# Patient Record
Sex: FEMALE | URBAN - METROPOLITAN AREA
[De-identification: names, ages, dates, MRNs, and addresses within clinical notes are randomized per-mention and may not be internally consistent; named-entity substitution may affect disease eponyms.]

---

## 2018-12-10 ENCOUNTER — APPOINTMENT (OUTPATIENT)
Age: 58
Setting detail: DERMATOLOGY
End: 2018-12-10

## 2018-12-10 DIAGNOSIS — L82.0 INFLAMED SEBORRHEIC KERATOSIS: ICD-10-CM

## 2018-12-10 DIAGNOSIS — Z85.820 PERSONAL HISTORY OF MALIGNANT MELANOMA OF SKIN: ICD-10-CM

## 2018-12-10 DIAGNOSIS — L57.0 ACTINIC KERATOSIS: ICD-10-CM

## 2018-12-10 DIAGNOSIS — L738 OTHER SPECIFIED DISEASES OF HAIR AND HAIR FOLLICLES: ICD-10-CM

## 2018-12-10 DIAGNOSIS — L663 OTHER SPECIFIED DISEASES OF HAIR AND HAIR FOLLICLES: ICD-10-CM

## 2018-12-10 PROBLEM — D48.5 NEOPLASM OF UNCERTAIN BEHAVIOR OF SKIN: Status: ACTIVE | Noted: 2018-12-10

## 2018-12-10 PROBLEM — L02.223 FURUNCLE OF CHEST WALL: Status: ACTIVE | Noted: 2018-12-10

## 2018-12-10 PROCEDURE — 11101: CPT

## 2018-12-10 PROCEDURE — OTHER COUNSELING: OTHER

## 2018-12-10 PROCEDURE — OTHER MIPS QUALITY: OTHER

## 2018-12-10 PROCEDURE — 99202 OFFICE O/P NEW SF 15 MIN: CPT | Mod: 25

## 2018-12-10 PROCEDURE — 11100: CPT

## 2018-12-10 PROCEDURE — OTHER PRESCRIPTION: OTHER

## 2018-12-10 PROCEDURE — OTHER BIOPSY BY SHAVE METHOD: OTHER

## 2018-12-10 RX ORDER — MUPIROCIN 20 MG/G
1 OINTMENT TOPICAL BID
Qty: 1 | Refills: 1 | Status: ERX | COMMUNITY
Start: 2018-12-10

## 2018-12-10 ASSESSMENT — LOCATION SIMPLE DESCRIPTION DERM
LOCATION SIMPLE: ABDOMEN
LOCATION SIMPLE: LEFT CALF
LOCATION SIMPLE: RIGHT THIGH
LOCATION SIMPLE: LEFT SHOULDER

## 2018-12-10 ASSESSMENT — LOCATION DETAILED DESCRIPTION DERM
LOCATION DETAILED: LEFT ANTERIOR SHOULDER
LOCATION DETAILED: RIGHT ANTERIOR DISTAL THIGH
LOCATION DETAILED: SUBXIPHOID
LOCATION DETAILED: LEFT PROXIMAL CALF

## 2018-12-10 ASSESSMENT — LOCATION ZONE DERM
LOCATION ZONE: LEG
LOCATION ZONE: TRUNK
LOCATION ZONE: ARM

## 2018-12-10 NOTE — PROCEDURE: MIPS QUALITY
Quality 111:Pneumonia Vaccination Status For Older Adults: Pneumococcal Vaccination not Administered or Previously Received, Reason not Otherwise Specified
Quality 131: Pain Assessment And Follow-Up: Pain assessment using a standardized tool is documented as negative, no follow-up plan required
Detail Level: Detailed
Quality 110: Preventive Care And Screening: Influenza Immunization: Influenza Immunization not Administered for Documented Reasons.
Quality 226: Preventive Care And Screening: Tobacco Use: Screening And Cessation Intervention: Patient screened for tobacco use and is an ex/non-smoker

## 2018-12-10 NOTE — PROCEDURE: BIOPSY BY SHAVE METHOD
Billing Type: Third-Party Bill
Dressing: bandage
Depth Of Biopsy: dermis
Render Post-Care Instructions In Note?: no
Anesthesia Type: 1% lidocaine with epinephrine and a 1:10 solution of 8.4% sodium bicarbonate
Biopsy Method: 15 blade
Hemostasis: Aluminum Chloride
Notification Instructions: Patient will be notified of biopsy results. However, patient instructed to call the office if not contacted within 2 weeks.
Curettage Text: The wound bed was treated with curettage after the biopsy was performed.
Electrodesiccation And Curettage Text: The wound bed was treated with electrodesiccation and curettage after the biopsy was performed.
Post-Care Instructions: I reviewed with the patient in detail post-care instructions. Patient is to keep the biopsy site dry overnight, and then apply bacitracin twice daily until healed. Patient may apply hydrogen peroxide soaks to remove any crusting.
Biopsy Type: H and E
Size Of Lesion In Cm: 0
Anesthesia Volume In Cc (Will Not Render If 0): 0.5
Type Of Destruction Used: Curettage
Was A Bandage Applied: Yes
Consent: Written consent was obtained and risks were reviewed including but not limited to scarring, infection, bleeding, scabbing, incomplete removal, nerve damage and allergy to anesthesia.
Silver Nitrate Text: The wound bed was treated with silver nitrate after the biopsy was performed.
Wound Care: Bacitracin
Cryotherapy Text: The wound bed was treated with cryotherapy after the biopsy was performed.
Electrodesiccation Text: The wound bed was treated with electrodesiccation after the biopsy was performed.
Detail Level: Detailed

## 2019-01-07 ENCOUNTER — APPOINTMENT (OUTPATIENT)
Age: 59
Setting detail: DERMATOLOGY
End: 2019-01-07

## 2019-01-07 DIAGNOSIS — L82.1 OTHER SEBORRHEIC KERATOSIS: ICD-10-CM

## 2019-01-07 DIAGNOSIS — L57.0 ACTINIC KERATOSIS: ICD-10-CM

## 2019-01-07 DIAGNOSIS — D22 MELANOCYTIC NEVI: ICD-10-CM

## 2019-01-07 DIAGNOSIS — Z85.820 PERSONAL HISTORY OF MALIGNANT MELANOMA OF SKIN: ICD-10-CM

## 2019-01-07 PROBLEM — D48.5 NEOPLASM OF UNCERTAIN BEHAVIOR OF SKIN: Status: ACTIVE | Noted: 2019-01-07

## 2019-01-07 PROBLEM — D22.62 MELANOCYTIC NEVI OF LEFT UPPER LIMB, INCLUDING SHOULDER: Status: ACTIVE | Noted: 2019-01-07

## 2019-01-07 PROCEDURE — 11301 SHAVE SKIN LESION 0.6-1.0 CM: CPT | Mod: 59

## 2019-01-07 PROCEDURE — OTHER LIQUID NITROGEN: OTHER

## 2019-01-07 PROCEDURE — OTHER SHAVE REMOVAL: OTHER

## 2019-01-07 PROCEDURE — OTHER COUNSELING: OTHER

## 2019-01-07 PROCEDURE — 17000 DESTRUCT PREMALG LESION: CPT

## 2019-01-07 PROCEDURE — OTHER ADDITIONAL NOTES: OTHER

## 2019-01-07 PROCEDURE — 99213 OFFICE O/P EST LOW 20 MIN: CPT | Mod: 25

## 2019-01-07 PROCEDURE — OTHER MIPS QUALITY: OTHER

## 2019-01-07 PROCEDURE — OTHER REASSURANCE: OTHER

## 2019-01-07 ASSESSMENT — LOCATION DETAILED DESCRIPTION DERM
LOCATION DETAILED: LEFT RIB CAGE
LOCATION DETAILED: LEFT PROXIMAL CALF
LOCATION DETAILED: RIGHT DISTAL PRETIBIAL REGION
LOCATION DETAILED: LEFT ANTERIOR SHOULDER
LOCATION DETAILED: RIGHT ANTERIOR DISTAL THIGH

## 2019-01-07 ASSESSMENT — LOCATION SIMPLE DESCRIPTION DERM
LOCATION SIMPLE: LEFT SHOULDER
LOCATION SIMPLE: ABDOMEN
LOCATION SIMPLE: RIGHT PRETIBIAL REGION
LOCATION SIMPLE: RIGHT THIGH
LOCATION SIMPLE: LEFT CALF

## 2019-01-07 ASSESSMENT — LOCATION ZONE DERM
LOCATION ZONE: ARM
LOCATION ZONE: LEG
LOCATION ZONE: TRUNK

## 2019-01-07 NOTE — PROCEDURE: ADDITIONAL NOTES
Additional Notes: We are going to refer the patient to an oncologist for further evalUation due to the history of Melanoma, also are going to obtain records from previous dermatologist where the melanoma was treated Dr. Sarmiento in [facility] Additional Notes: We are going to refer the patient to an oncologist for further evalUation due to the history of Melanoma, also are going to obtain records from previous dermatologist where the melanoma was treated Dr. Sarmiento in [facility]

## 2019-01-07 NOTE — PROCEDURE: REASSURANCE
Detail Level: Detailed
Hide Include Location In Plan Question?: No
Detail Level: Zone
Detail Level: Simple

## 2019-01-07 NOTE — PROCEDURE: MIPS QUALITY
Quality 111:Pneumonia Vaccination Status For Older Adults: Pneumococcal Vaccination not Administered or Previously Received, Reason not Otherwise Specified
Quality 226: Preventive Care And Screening: Tobacco Use: Screening And Cessation Intervention: Patient screened for tobacco use and is an ex/non-smoker
Quality 131: Pain Assessment And Follow-Up: Pain assessment using a standardized tool is documented as negative, no follow-up plan required
Detail Level: Detailed
Quality 110: Preventive Care And Screening: Influenza Immunization: Influenza Immunization not Administered for Documented Reasons.

## 2019-01-07 NOTE — PROCEDURE: SHAVE REMOVAL
Was A Bandage Applied: Yes
X Size Of Lesion In Cm (Optional): 0
Render Post-Care Instructions In Note?: no
Detail Level: Detailed
Billing Type: Third-Party Bill
Hemostasis: Drysol
Size Of Lesion In Cm (Required): 0.6
Consent was obtained from the patient. The risks and benefits to therapy were discussed in detail. Specifically, the risks of infection, scarring, bleeding, prolonged wound healing, incomplete removal, allergy to anesthesia, nerve injury and recurrence were addressed. Prior to the procedure, the treatment site was clearly identified and confirmed by the patient. All components of Universal Protocol/PAUSE Rule completed.
Medical Necessity Clause: This procedure was medically necessary because the lesion that was treated was:
Biopsy Method: Dermablade
Wound Care: Bacitracin
Post-Care Instructions: I reviewed with the patient in detail post-care instructions. Patient is to keep the biopsy site dry overnight, and then apply bacitracin twice daily until healed. Patient may apply hydrogen peroxide soaks to remove any crusting.
Path Notes (To The Dermatopathologist): Please check margins.
Anesthesia Type: 1% lidocaine without epinephrine
Medical Necessity Information: It is in your best interest to select a reason for this procedure from the list below. All of these items fulfill various CMS LCD requirements except the new and changing color options.
Notification Instructions: Patient will be notified of biopsy results. However, patient instructed to call the office if not contacted within 2 weeks.